# Patient Record
Sex: FEMALE | Race: WHITE | Employment: PART TIME
[De-identification: names, ages, dates, MRNs, and addresses within clinical notes are randomized per-mention and may not be internally consistent; named-entity substitution may affect disease eponyms.]

---

## 2018-03-01 ENCOUNTER — TELEPHONE (OUTPATIENT)
Dept: CASE MANAGEMENT | Age: 58
End: 2018-03-01

## 2018-03-05 ENCOUNTER — TELEPHONE (OUTPATIENT)
Dept: CASE MANAGEMENT | Age: 58
End: 2018-03-05

## 2018-03-05 NOTE — TELEPHONE ENCOUNTER
No call, encounter opened to process CT Lung Screening. CT Lung Screen 3/2/18 :  Impression           1. No evidence for pulmonary nodules. 2. Scattered small lymph nodes in the mediastinum possibly reactive. 3. Small hiatal hernia.               Jodie Engel is a  56 y.o. female who is a former (1 year ago) smoker with a 35 pack year smoking history. Results have been routed/faxed to ordering physician. Letter mailed to patient  3/5/2018 encouraging her  to call her physician for results and follow up recommendations if needed.             MARCIANO Celestin., R.T.(R)(T)  Patient [address] [facility]    [phone]

## 2018-03-05 NOTE — LETTER
Medical Imaging Services      3/5/2018        Leonor Rodriguez  [address] [address]            Dear JORGE Nguyen: Your recent CT Lung Screen                      We are pleased to inform you that your exam showed no signs of lung cancer. We recommend that your next lung screening exam be in 12 months. Here are some other important points you should know:      Your full low-dose Chest CT report, including any minor observations, has been sent to your health care provider. Your exam report and images will be kept on file at [facility]) as part of your permanent record and are available for your continuing care.  Although low-dose chest CT is very effective at finding lung cancer early, it cannot find all lung cancers. If you develop any new symptoms such as shortness of breath, chest pain, or coughing up blood, please call your doctor.  Please keep in mind that good health involves quitting smoking (for help, call [address] at [phone]), an annual physical exam, and continued screening with low-dose chest CT. If you have any questions about this letter or have difficulty in contacting your health care provider please call our patient navigator, Chantell Turner at [phone]. Sincerely, [facility]                        Medical Imaging Services        3/5/2018                         Vern Pandya, [address] [facility] [address]            Dear Vern Pandya MD:                                                                                                                                                     Thank you for referring your patient, Leonor Rodriguez (1960) to our [address] [employer] [facility].   The results of the screening are available in [facility] and have been faxed to your office.   Please contact us

## 2019-02-01 ENCOUNTER — TELEPHONE (OUTPATIENT)
Dept: CASE MANAGEMENT | Age: 59
End: 2019-02-01

## 2019-03-08 ENCOUNTER — HOSPITAL ENCOUNTER (OUTPATIENT)
Age: 59
Discharge: HOME | End: 2019-03-10
Payer: COMMERCIAL

## 2019-03-08 ENCOUNTER — HOSPITAL ENCOUNTER (OUTPATIENT)
Dept: ULTRASOUND IMAGING | Age: 59
Discharge: HOME | End: 2019-03-10
Payer: COMMERCIAL

## 2019-03-08 DIAGNOSIS — R79.89 ABNORMAL LFTS: ICD-10-CM

## 2019-03-08 PROCEDURE — 76700 US EXAM ABDOM COMPLETE: CPT

## 2019-03-08 PROCEDURE — 76705 ECHO EXAM OF ABDOMEN: CPT

## 2019-11-22 ENCOUNTER — HOSPITAL ENCOUNTER (OUTPATIENT)
Age: 59
Discharge: HOME | End: 2019-11-24

## 2019-11-22 PROCEDURE — 87081 CULTURE SCREEN ONLY: CPT

## 2019-11-22 PROCEDURE — 88305 TISSUE EXAM BY PATHOLOGIST: CPT

## 2019-11-23 LAB — CLOTEST: NORMAL

## 2020-06-08 ENCOUNTER — TELEPHONE (OUTPATIENT)
Dept: NUTRITION | Age: 60
End: 2020-06-08

## 2020-08-26 ENCOUNTER — HOSPITAL ENCOUNTER (OUTPATIENT)
Dept: GENERAL RADIOLOGY | Age: 60
Discharge: HOME | End: 2020-08-28
Payer: COMMERCIAL

## 2020-08-26 ENCOUNTER — HOSPITAL ENCOUNTER (OUTPATIENT)
Dept: ULTRASOUND IMAGING | Age: 60
Discharge: HOME | End: 2020-08-28
Payer: COMMERCIAL

## 2020-08-26 ENCOUNTER — HOSPITAL ENCOUNTER (OUTPATIENT)
Age: 60
Discharge: HOME | End: 2020-08-28
Payer: COMMERCIAL

## 2020-08-26 PROCEDURE — 73564 X-RAY EXAM KNEE 4 OR MORE: CPT

## 2020-08-26 PROCEDURE — 76882 US LMTD JT/FCL EVL NVASC XTR: CPT

## 2021-12-03 ENCOUNTER — TELEPHONE (OUTPATIENT)
Dept: CASE MANAGEMENT | Age: 61
End: 2021-12-03

## 2021-12-03 NOTE — TELEPHONE ENCOUNTER
I called the patient and left a message reminding her of the CT lung screening at [place] on 12/6/2021 at 2:30 pm with an 2:00 pm arrival.  If unable to keep this appt call the office at [phone] to get rescheduled.           Electronically signed by Joe Salgado on 12/3/21 at 3:48 PM EST

## 2021-12-06 ENCOUNTER — APPOINTMENT (OUTPATIENT)
Dept: CT IMAGING | Age: 61
End: 2021-12-06
Payer: COMMERCIAL

## 2021-12-06 ENCOUNTER — HOSPITAL ENCOUNTER (OUTPATIENT)
Dept: CT IMAGING | Age: 61
Discharge: HOME | End: 2021-12-08
Payer: COMMERCIAL

## 2021-12-06 DIAGNOSIS — F17.211 CIGARETTE NICOTINE DEPENDENCE IN REMISSION: ICD-10-CM

## 2021-12-06 PROCEDURE — 71271 CT THORAX LUNG CANCER SCR C-: CPT

## 2021-12-07 ENCOUNTER — TELEPHONE (OUTPATIENT)
Dept: CASE MANAGEMENT | Age: 61
End: 2021-12-07

## 2021-12-07 NOTE — TELEPHONE ENCOUNTER
No call, encounter opened to process CT Lung Screening. CT Lung Screen: 12/6/2021    Impression   1. There is no pulmonary infiltrate, mass or suspicious pulmonary nodule   2. Very small hiatal hernia   3. Postsurgical changes of the superior pole of the left kidney. Category 1, Negative (No nodules and definitely benign nodules). Management: Continue annual lung screening with LDCT in 12 months. LUNG RADS:   Per ACR Lung-RADS Version 1.1       RECOMMENDATIONS:   If you would like to register your patient with the [facility], please contact the Nurse Navigator at   [phone]. Pack years: 61    Social History     Tobacco Use  Smoking Status: Former Smoker    Start Date:    Quit Date: 03/05/2017   Types: Cigarettes   Packs/Day: 1.5   Years: 36   Pack Years: 61   Smokeless Tobacco:         Results letter sent to patient via my chart or mailed.      [facility]

## 2022-01-19 ENCOUNTER — HOSPITAL ENCOUNTER (OUTPATIENT)
Dept: GENERAL RADIOLOGY | Age: 62
Discharge: HOME | End: 2022-01-21
Payer: COMMERCIAL

## 2022-01-19 ENCOUNTER — HOSPITAL ENCOUNTER (OUTPATIENT)
Age: 62
Discharge: HOME | End: 2022-01-21
Payer: COMMERCIAL

## 2022-01-19 DIAGNOSIS — S63.511A: ICD-10-CM

## 2022-01-19 PROCEDURE — 73110 X-RAY EXAM OF WRIST: CPT

## 2023-01-10 ENCOUNTER — TELEPHONE (OUTPATIENT)
Dept: CASE MANAGEMENT | Age: 63
End: 2023-01-10

## 2023-01-10 ENCOUNTER — HOSPITAL ENCOUNTER (OUTPATIENT)
Dept: CT IMAGING | Age: 63
Discharge: HOME | End: 2023-01-12
Payer: COMMERCIAL

## 2023-01-10 DIAGNOSIS — F17.211 CIGARETTE NICOTINE DEPENDENCE IN REMISSION: ICD-10-CM

## 2023-01-10 PROCEDURE — 71271 CT THORAX LUNG CANCER SCR C-: CPT

## 2023-01-10 NOTE — TELEPHONE ENCOUNTER
I called the patient on 1/9/2023 and left a message reminding her of the CT lung screening at [place] on 1/10/2023 at 2:00 pm with an 1:30 pm arrival.  If unable to keep this appt call the office at [phone] to get rescheduled.             Electronically signed by Clement Rene on 1/10/23 at 8:33 AM EST

## 2023-01-11 ENCOUNTER — TELEPHONE (OUTPATIENT)
Dept: CASE MANAGEMENT | Age: 63
End: 2023-01-11

## 2023-01-11 NOTE — TELEPHONE ENCOUNTER
No call, encounter opened to process CT Lung Screening. CT Lung Screen: 1/10/2023    Impression   There is no pulmonary infiltrate, mass or suspicious pulmonary nodule       Small hiatal hernia       Postsurgical changes of the superior pole left kidney. LUNG RADS:   Per ACR Lung-RADS Version 1.1       Category 1, Negative (No nodules and definitely benign nodules). Management: Continue annual lung screening with LDCT in 12 months. RECOMMENDATIONS:   If you would like to register your patient with the [facility], please contact the Nurse Navigator at   [phone]. Pack years: 61    Social History     Tobacco Use  Smoking Status: Former Smoker    Start Date:    Quit Date: 03/05/2017   Types: Cigarettes   Packs/Day: 1.5   Years: 36   Pack Years: 61   Smokeless Tobacco: Unknown        Results letter sent to patient via my chart or mailed.      [facility]

## 2024-03-07 ENCOUNTER — HOSPITAL ENCOUNTER (OUTPATIENT)
Dept: CT IMAGING | Age: 64
Discharge: HOME | End: 2024-03-09
Payer: COMMERCIAL

## 2024-03-07 DIAGNOSIS — F17.211 CIGARETTE NICOTINE DEPENDENCE IN REMISSION: ICD-10-CM

## 2024-03-07 PROCEDURE — 71271 CT THORAX LUNG CANCER SCR C-: CPT

## 2024-03-08 ENCOUNTER — TELEPHONE (OUTPATIENT)
Dept: CASE MANAGEMENT | Age: 64
End: 2024-03-08

## 2024-03-08 NOTE — TELEPHONE ENCOUNTER
No call, encounter opened to process CT Lung Screening.    CT Lung Screen: 3/7/2024    IMPRESSION:  There is no evidence of noncalcified pulmonary nodule.     Patchy mixed ground-glass and airspace densities in the perihilar right  middle and upper lobes is likely infectious/inflammatory pneumonitis.  Follow-up CT of the chest in 3 months is recommended to confirm resolution.     LUNG RADS:  Lung-RADS 1S - Negative, Significant or Potentially Significant Incidental  Findings (v2022)     Management:  12 month screening LDCT     RECOMMENDATIONS:  If you would like to register your patient with the [facility] Lung Nodule/Lung  Cancer Screening Program, please contact the Nurse Navigator at  [phone].    Pack years: 60    Social History     Tobacco Use  Smoking Status: Former Smoker   Start Date: 1977   Quit Date: 2017   Types: Cigarettes   Packs/Day: 1.5   Years: 40   Pack Years: 60   Smokeless Tobacco: Unknown        Results letter sent to patient via my chart or mailed.     Colton Bello  Imaging Navigator   [facility]   [phone]

## 2024-04-23 ENCOUNTER — HOSPITAL ENCOUNTER (OUTPATIENT)
Age: 64
Discharge: HOME | End: 2024-04-25
Payer: COMMERCIAL

## 2024-04-23 ENCOUNTER — HOSPITAL ENCOUNTER (OUTPATIENT)
Dept: GENERAL RADIOLOGY | Age: 64
Discharge: HOME | End: 2024-04-25
Payer: COMMERCIAL

## 2024-04-23 DIAGNOSIS — M79.10 MYALGIA: ICD-10-CM

## 2024-04-23 DIAGNOSIS — M50.122 DISORDER OF INTERVERTEBRAL DISC AT C5-C6 LEVEL WITH RADICULOPATHY: ICD-10-CM

## 2024-04-23 PROCEDURE — 72050 X-RAY EXAM NECK SPINE 4/5VWS: CPT

## 2024-06-10 ENCOUNTER — HOSPITAL ENCOUNTER (OUTPATIENT)
Dept: CT IMAGING | Age: 64
Discharge: HOME | End: 2024-06-12
Payer: COMMERCIAL

## 2024-06-10 DIAGNOSIS — J32.9 CHRONIC SINUSITIS, UNSPECIFIED LOCATION: ICD-10-CM

## 2024-06-10 DIAGNOSIS — E04.2 NONTOXIC MULTINODULAR GOITER: ICD-10-CM

## 2024-06-10 DIAGNOSIS — D38.1 NEOPLASM OF UNCERTAIN BEHAVIOR OF TRACHEA, BRONCHUS, AND LUNG: ICD-10-CM

## 2024-06-10 PROCEDURE — 71250 CT THORAX DX C-: CPT

## 2024-06-10 PROCEDURE — 70486 CT MAXILLOFACIAL W/O DYE: CPT

## 2024-06-13 PROBLEM — C80.1 MALIGNANT NEOPLASM (HCC): Status: ACTIVE | Noted: 2023-03-01

## 2024-06-25 ENCOUNTER — HOSPITAL ENCOUNTER (OUTPATIENT)
Age: 64
Discharge: HOME | End: 2024-06-25
Payer: COMMERCIAL

## 2024-06-25 LAB
CORTIS SERPL-MCNC: 10.9 UG/DL (ref 2.7–18.4)
T3FREE SERPL-MCNC: 3.57 PG/ML (ref 2–4.4)
T4 FREE SERPL-MCNC: 1.5 NG/DL (ref 0.9–1.7)
TSH SERPL DL<=0.05 MIU/L-ACNC: 0.53 UIU/ML (ref 0.27–4.2)

## 2024-06-25 PROCEDURE — 84481 FREE ASSAY (FT-3): CPT

## 2024-06-25 PROCEDURE — 36415 COLL VENOUS BLD VENIPUNCTURE: CPT

## 2024-06-25 PROCEDURE — 84439 ASSAY OF FREE THYROXINE: CPT

## 2024-06-25 PROCEDURE — 83835 ASSAY OF METANEPHRINES: CPT

## 2024-06-25 PROCEDURE — 84443 ASSAY THYROID STIM HORMONE: CPT

## 2024-06-25 PROCEDURE — 82533 TOTAL CORTISOL: CPT

## 2024-06-27 ENCOUNTER — HOSPITAL ENCOUNTER (OUTPATIENT)
Age: 64
Setting detail: SPECIMEN
Discharge: HOME | End: 2024-06-27
Payer: COMMERCIAL

## 2024-06-27 PROCEDURE — 83835 ASSAY OF METANEPHRINES: CPT

## 2024-06-27 PROCEDURE — 82530 CORTISOL FREE: CPT

## 2024-06-29 LAB
METANEPH/PLASMA INTERP: NORMAL
METANEPHRINE: 0.28 NMOL/L (ref 0–0.49)
NORMETANEPHRINE PLASMA: 0.7 NMOL/L (ref 0–0.89)

## 2024-07-01 LAB
COLLECT DURATION TIME SPEC: 24 H
COLLECT DURATION TIME SPEC: 24 H
CORTISOL (UR), FREE: 26.9 UG/D
CORTISOL URINE, FREE (/G CRT): 8.54 UG/L
CORTISOL, URINE RATIO CREATININE: 27.55 UG/G CRT
CORTISOL, URINE: NORMAL
CREAT 24H UR-MCNC: 31 MG/DL
CREAT 24H UR-MCNC: 31 MG/DL
CREATININE URINE /24 HR: 976 MG/D (ref 500–1400)
CREATININE URINE /24 HR: 976 MG/D (ref 500–1400)
METANEPHRINE UF INTERPRETATION: ABNORMAL
METANEPHRINE UG/G CRE: 177 UG/G CRT (ref 0–300)
METANEPHRINES 24 HOUR URINE: 173 UG/D (ref 36–229)
METANEPHRINES, URINE (UMOL/L): 55 UG/L
NORMETANEPHRINE, (G/CRT): 481 UG/G CRT (ref 0–400)
NORMETANEPHRINE, (NMOL/DAY): 469 UG/D (ref 95–650)
NORMETANEPHRINES, NMOL/L: 149 UG/L
SPECIMEN VOL ?TM UR: 3150 ML
SPECIMEN VOL ?TM UR: 3150 ML

## 2024-07-02 ENCOUNTER — HOSPITAL ENCOUNTER (OUTPATIENT)
Dept: ULTRASOUND IMAGING | Age: 64
Discharge: HOME | End: 2024-07-04
Payer: COMMERCIAL

## 2024-07-02 DIAGNOSIS — E04.1 LEFT THYROID NODULE: ICD-10-CM

## 2024-07-02 DIAGNOSIS — E04.1 RIGHT THYROID NODULE: ICD-10-CM

## 2024-07-02 PROCEDURE — 88172 CYTP DX EVAL FNA 1ST EA SITE: CPT

## 2024-07-02 PROCEDURE — 88173 CYTOPATH EVAL FNA REPORT: CPT

## 2024-07-02 PROCEDURE — 10006 FNA BX W/US GDN EA ADDL: CPT

## 2024-07-02 PROCEDURE — 88305 TISSUE EXAM BY PATHOLOGIST: CPT

## 2024-07-02 PROCEDURE — 10005 FNA BX W/US GDN 1ST LES: CPT

## 2024-07-02 NOTE — DISCHARGE INSTRUCTIONS
supplements.   Plan ahead for refills so you don't run out.   Lifestyle Changes    You and your doctor will plan lifestyle changes that will aid in your recovery.   If the thyroid cells have normal appearance and makeup, then there is no indication of cancer. Abnormal results can indicate the following causes:   Cancer   A noncancerous tumor   Other thyroid disease   Follow-up   If cancer is found, you will be referred to a thyroid surgeon. Thyroid cancer is usually curable. Any potentially cancerous nodule that is not removed should be examined every 6-12 months.   Schedule a follow-up appointment as directed by your doctor.    Call Your Doctor If Any of the Following Occurs   Monitor your recovery once you leave the hospital. As soon as you have a problem, alert your doctor. If any of the following occur, call your doctor:   Bleeding   Signs of infection, including fever and chills   Trouble breathing or swallowing   Excessive neck swelling   Unusual pain or discomfort   In case of an emergency, call 911 immediately.     Last Reviewed: December 2010 Lucius Maya MD, MPH   Updated: 12/1/2010

## 2024-07-03 LAB — NON-GYN CYTOLOGY REPORT: NORMAL

## 2024-09-25 ENCOUNTER — HOSPITAL ENCOUNTER (OUTPATIENT)
Dept: GENERAL RADIOLOGY | Age: 64
Discharge: HOME | End: 2024-09-27
Payer: COMMERCIAL

## 2024-09-25 ENCOUNTER — HOSPITAL ENCOUNTER (OUTPATIENT)
Age: 64
Discharge: HOME | End: 2024-09-27
Payer: COMMERCIAL

## 2024-09-25 DIAGNOSIS — M54.50 LUMBAR PAIN: ICD-10-CM

## 2024-09-25 PROCEDURE — 72110 X-RAY EXAM L-2 SPINE 4/>VWS: CPT

## 2025-07-24 ENCOUNTER — HOSPITAL ENCOUNTER (OUTPATIENT)
Dept: CT IMAGING | Age: 65
Discharge: HOME | End: 2025-07-26
Payer: COMMERCIAL

## 2025-07-24 DIAGNOSIS — Z87.891 PERSONAL HISTORY OF TOBACCO USE: ICD-10-CM

## 2025-07-24 PROCEDURE — 71271 CT THORAX LUNG CANCER SCR C-: CPT
